# Patient Record
Sex: MALE | Race: OTHER | Employment: OTHER | URBAN - METROPOLITAN AREA
[De-identification: names, ages, dates, MRNs, and addresses within clinical notes are randomized per-mention and may not be internally consistent; named-entity substitution may affect disease eponyms.]

---

## 2017-01-04 DIAGNOSIS — R45.86 MOOD CHANGES: ICD-10-CM

## 2017-01-04 RX ORDER — BUPROPION HYDROCHLORIDE 75 MG/1
TABLET ORAL
Qty: 100 TAB | Refills: 0 | Status: SHIPPED | OUTPATIENT
Start: 2017-01-04 | End: 2017-01-23 | Stop reason: SDUPTHER

## 2017-01-04 NOTE — TELEPHONE ENCOUNTER
Patient's wife states patient is completely out of his medication. This patient contacted office for the following prescriptions to be filled:    Medication requested :   Requested Prescriptions     Pending Prescriptions Disp Refills    buPROPion (WELLBUTRIN) 75 mg tablet 100 Tab 0     Sig: Start taking one tab bid for one week then 2 tabs bid.      PCP: [facility]  Pharmacy or Print: [address]  Mail order or Local pharmacy: [phone]    Scheduled appointment if not seen by current providers in office: LOV: 12/2/2016, Next appt: 1/25/17

## 2017-01-10 ENCOUNTER — HOSPITAL ENCOUNTER (OUTPATIENT)
Age: 57
Discharge: HOME | End: 2017-01-10
Attending: PSYCHIATRY & NEUROLOGY
Payer: MEDICARE

## 2017-01-10 DIAGNOSIS — R20.9 CVA, OLD, ALTERATIONS OF SENSATIONS: ICD-10-CM

## 2017-01-10 DIAGNOSIS — I69.398 CVA, OLD, ALTERATIONS OF SENSATIONS: ICD-10-CM

## 2017-01-10 PROCEDURE — 70551 MRI BRAIN STEM W/O DYE: CPT

## 2017-01-13 ENCOUNTER — HOSPITAL ENCOUNTER (OUTPATIENT)
Dept: LAB | Age: 57
Discharge: HOME | End: 2017-01-13
Payer: MEDICARE

## 2017-01-13 LAB
ERYTHROCYTE [SEDIMENTATION RATE] IN BLOOD: 19 MM/HR (ref 0–20)
FOLATE SERPL-MCNC: >20 NG/ML (ref 3.1–17.5)
RPR SER QL: NONREACTIVE
T4 FREE SERPL-MCNC: 0.8 NG/DL (ref 0.7–1.5)
TSH SERPL DL<=0.05 MIU/L-ACNC: 2 UIU/ML (ref 0.36–3.74)
VIT B12 SERPL-MCNC: 291 PG/ML (ref 211–911)

## 2017-01-13 PROCEDURE — 84439 ASSAY OF FREE THYROXINE: CPT | Performed by: FAMILY MEDICINE

## 2017-01-13 PROCEDURE — 82607 VITAMIN B-12: CPT | Performed by: PSYCHIATRY & NEUROLOGY

## 2017-01-13 PROCEDURE — 86038 ANTINUCLEAR ANTIBODIES: CPT | Performed by: PSYCHIATRY & NEUROLOGY

## 2017-01-13 PROCEDURE — 85652 RBC SED RATE AUTOMATED: CPT | Performed by: PSYCHIATRY & NEUROLOGY

## 2017-01-13 PROCEDURE — 86592 SYPHILIS TEST NON-TREP QUAL: CPT | Performed by: PSYCHIATRY & NEUROLOGY

## 2017-01-13 PROCEDURE — 36415 COLL VENOUS BLD VENIPUNCTURE: CPT | Performed by: FAMILY MEDICINE

## 2017-01-14 LAB
ANA SER QL: NEGATIVE
SEE BELOW:, 164879: NORMAL

## 2017-01-17 NOTE — PROGRESS NOTES
Spoke with patient's wife (HIPPA verified) regarding thyroid function is within normal limits. Patient's wife informed patient to continue current dose of medication. Patient's wife concerned with patient's kidney function from previous lab results. Patient's wife informed to have patient keep follow up visit (1/25/2017) to discuss further. Patient's wife voiced understanding.

## 2017-01-23 DIAGNOSIS — F41.9 ANXIETY: ICD-10-CM

## 2017-01-23 DIAGNOSIS — R45.86 MOOD CHANGES: ICD-10-CM

## 2017-01-23 DIAGNOSIS — G62.9 NEUROPATHY: ICD-10-CM

## 2017-01-24 RX ORDER — AMITRIPTYLINE HYDROCHLORIDE 10 MG/1
TABLET, FILM COATED ORAL
Qty: 30 TAB | Refills: 0 | Status: SHIPPED | OUTPATIENT
Start: 2017-01-24 | End: 2020-02-23

## 2017-01-24 RX ORDER — BUPROPION HYDROCHLORIDE 75 MG/1
TABLET ORAL
Qty: 100 TAB | Refills: 0 | Status: SHIPPED | OUTPATIENT
Start: 2017-01-24 | End: 2020-02-23

## 2017-02-09 DIAGNOSIS — G25.81 RESTLESS LEG SYNDROME: ICD-10-CM

## 2017-02-09 RX ORDER — ROPINIROLE 0.25 MG/1
TABLET, FILM COATED ORAL
Qty: 30 TAB | Refills: 0 | Status: SHIPPED | COMMUNITY
Start: 2017-02-09

## 2017-05-10 ENCOUNTER — TELEPHONE (OUTPATIENT)
Dept: FAMILY MEDICINE CLINIC | Age: 57
End: 2017-05-10

## 2017-05-10 NOTE — TELEPHONE ENCOUNTER
Called Veronica Resendiz with [address] regarding we have not received the narrative with 6 questionnaire. Message left for Veronica Resendiz to resend the form to us and/or call back to discuss it.

## 2017-05-10 NOTE — TELEPHONE ENCOUNTER
Dwayne Ramírez sent over a request for a narrative questionnaire with 6 questions. She would like to know if it was received and will it be done. Please advise Dwayne Ramírez [phone] with [address] [place] [address].

## 2017-05-11 NOTE — TELEPHONE ENCOUNTER
Spoke with patient (2 verifiers name/) regarding not having received the narrative with the 6 questionnaire. Per Ms Hodan Lucio, they will not be needing the questionnaire and this time as they have already submitted the appeal and the questionnaire will be received by them too late. Ms Hodan Lucio informed Dr Ortiz Getting will be given the message. Ms Hodan Lucio voiced understanding.

## 2017-05-26 ENCOUNTER — TELEPHONE (OUTPATIENT)
Dept: FAMILY MEDICINE CLINIC | Age: 57
End: 2017-05-26

## 2017-05-26 NOTE — TELEPHONE ENCOUNTER
Left message for patient to call the office regarding Dr Yifan Mcleod message wanting to know if the patient has been seen by endocrinology and the need for follow up.

## 2017-06-06 ENCOUNTER — TELEPHONE (OUTPATIENT)
Dept: FAMILY MEDICINE CLINIC | Age: 57
End: 2017-06-06

## 2017-06-06 NOTE — TELEPHONE ENCOUNTER
Stacie Moffett from Dr. Tressa Julio office called this afternoon. She is requesting nurse to give her a call back regarding patient's disability activity restriction. Ph# [phone].

## 2017-06-06 NOTE — TELEPHONE ENCOUNTER
Left message on voicemail to call back. Need to inform that patient is no longer being seen at her office.

## 2019-05-21 ENCOUNTER — HOSPITAL ENCOUNTER (OUTPATIENT)
Dept: CT IMAGING | Age: 59
Discharge: HOME | End: 2019-05-21
Attending: INTERNAL MEDICINE
Payer: MEDICARE

## 2019-05-21 DIAGNOSIS — R63.4 LOSS OF WEIGHT: ICD-10-CM

## 2019-05-21 DIAGNOSIS — K90.9 INTESTINAL MALABSORPTION: ICD-10-CM

## 2019-05-21 LAB — CREAT UR-MCNC: 0.9 MG/DL (ref 0.6–1.3)

## 2019-05-21 PROCEDURE — 74177 CT ABD & PELVIS W/CONTRAST: CPT

## 2019-05-21 PROCEDURE — 74011636320 HC RX REV CODE- 636/320

## 2019-05-21 PROCEDURE — 82565 ASSAY OF CREATININE: CPT

## 2019-05-21 RX ADMIN — IOPAMIDOL 100 ML: 612 INJECTION, SOLUTION INTRAVENOUS at 14:14

## 2019-11-27 ENCOUNTER — HOSPITAL ENCOUNTER (OUTPATIENT)
Dept: GENERAL RADIOLOGY | Age: 59
Discharge: HOME | End: 2019-11-27
Attending: INTERNAL MEDICINE
Payer: MEDICARE

## 2019-11-27 DIAGNOSIS — R07.81 RIB PAIN ON LEFT SIDE: ICD-10-CM

## 2019-11-27 DIAGNOSIS — R63.4 LOSS OF WEIGHT: ICD-10-CM

## 2019-11-27 DIAGNOSIS — R63.0 LOSS OF APPETITE: ICD-10-CM

## 2019-11-27 PROCEDURE — 74011000255 HC RX REV CODE- 255

## 2019-11-27 PROCEDURE — 74245 XR UPPER GI/SMALL BOWEL: CPT

## 2019-11-27 PROCEDURE — 71100 X-RAY EXAM RIBS UNI 2 VIEWS: CPT

## 2019-11-27 RX ADMIN — BARIUM SULFATE 352 G: 960 POWDER, FOR SUSPENSION ORAL at 09:05

## 2019-11-27 RX ADMIN — BARIUM SULFATE 700 MG: 700 TABLET ORAL at 09:05

## 2019-12-04 NOTE — PROGRESS NOTES
Kathi from [facility] notified DR. Ochoa will follow pt's Plan of care.   Left message for patient to call the office regarding results.

## 2019-12-09 ENCOUNTER — HOSPITAL ENCOUNTER (OUTPATIENT)
Age: 59
Discharge: HOME | End: 2019-12-09
Attending: INTERNAL MEDICINE
Payer: MEDICARE

## 2019-12-09 DIAGNOSIS — K86.1 CHRONIC PANCREATITIS (HCC): ICD-10-CM

## 2019-12-09 LAB — CREAT UR-MCNC: 0.9 MG/DL (ref 0.6–1.3)

## 2019-12-09 PROCEDURE — 74011250636 HC RX REV CODE- 250/636: Performed by: INTERNAL MEDICINE

## 2019-12-09 PROCEDURE — 74183 MRI ABD W/O CNTR FLWD CNTR: CPT

## 2019-12-09 PROCEDURE — A9577 INJ MULTIHANCE: HCPCS | Performed by: INTERNAL MEDICINE

## 2019-12-09 PROCEDURE — 82565 ASSAY OF CREATININE: CPT

## 2019-12-09 RX ADMIN — GADOBENATE DIMEGLUMINE 20 ML: 529 INJECTION, SOLUTION INTRAVENOUS at 10:00

## 2020-02-23 PROBLEM — D72.829 LEUKOCYTOSIS: Status: ACTIVE | Noted: 2020-02-23

## 2020-02-23 PROBLEM — K86.1 CHRONIC PANCREATITIS (HCC): Status: ACTIVE | Noted: 2020-02-23

## 2020-02-23 PROBLEM — E86.0 DEHYDRATION: Status: ACTIVE | Noted: 2020-02-23

## 2020-02-23 PROBLEM — R11.2 INTRACTABLE NAUSEA AND VOMITING: Status: ACTIVE | Noted: 2020-02-23

## 2021-03-19 ENCOUNTER — OFFICE VISIT (OUTPATIENT)
Dept: ORTHOPEDIC SURGERY | Age: 61
End: 2021-03-19
Payer: MEDICARE

## 2021-03-19 VITALS
HEIGHT: 71 IN | WEIGHT: 173 LBS | OXYGEN SATURATION: 99 % | BODY MASS INDEX: 24.22 KG/M2 | HEART RATE: 83 BPM | RESPIRATION RATE: 16 BRPM | TEMPERATURE: 98 F

## 2021-03-19 DIAGNOSIS — M25.562 CHRONIC PAIN OF LEFT KNEE: ICD-10-CM

## 2021-03-19 DIAGNOSIS — M16.12 PRIMARY OSTEOARTHRITIS OF LEFT HIP: ICD-10-CM

## 2021-03-19 DIAGNOSIS — K85.20 ALCOHOL-INDUCED ACUTE PANCREATITIS, UNSPECIFIED COMPLICATION STATUS: ICD-10-CM

## 2021-03-19 DIAGNOSIS — M25.552 CHRONIC LEFT HIP PAIN: ICD-10-CM

## 2021-03-19 DIAGNOSIS — G89.29 CHRONIC LEFT HIP PAIN: ICD-10-CM

## 2021-03-19 DIAGNOSIS — G89.29 CHRONIC PAIN OF LEFT KNEE: ICD-10-CM

## 2021-03-19 DIAGNOSIS — M17.12 PRIMARY OSTEOARTHRITIS OF LEFT KNEE: Primary | ICD-10-CM

## 2021-03-19 PROCEDURE — 20610 DRAIN/INJ JOINT/BURSA W/O US: CPT | Performed by: SPECIALIST

## 2021-03-19 PROCEDURE — G8432 DEP SCR NOT DOC, RNG: HCPCS | Performed by: SPECIALIST

## 2021-03-19 PROCEDURE — G8420 CALC BMI NORM PARAMETERS: HCPCS | Performed by: SPECIALIST

## 2021-03-19 PROCEDURE — G8427 DOCREV CUR MEDS BY ELIG CLIN: HCPCS | Performed by: SPECIALIST

## 2021-03-19 PROCEDURE — 99203 OFFICE O/P NEW LOW 30 MIN: CPT | Performed by: SPECIALIST

## 2021-03-19 PROCEDURE — G8756 NO BP MEASURE DOC: HCPCS | Performed by: SPECIALIST

## 2021-03-19 PROCEDURE — 3017F COLORECTAL CA SCREEN DOC REV: CPT | Performed by: SPECIALIST

## 2021-03-19 RX ORDER — BETAMETHASONE SODIUM PHOSPHATE AND BETAMETHASONE ACETATE 3; 3 MG/ML; MG/ML
3 INJECTION, SUSPENSION INTRA-ARTICULAR; INTRALESIONAL; INTRAMUSCULAR; SOFT TISSUE ONCE
Status: COMPLETED | OUTPATIENT
Start: 2021-03-19 | End: 2021-03-19

## 2021-03-19 RX ADMIN — BETAMETHASONE SODIUM PHOSPHATE AND BETAMETHASONE ACETATE 3 MG: 3; 3 INJECTION, SUSPENSION INTRA-ARTICULAR; INTRALESIONAL; INTRAMUSCULAR; SOFT TISSUE at 16:15

## 2021-03-19 RX ADMIN — BETAMETHASONE SODIUM PHOSPHATE AND BETAMETHASONE ACETATE 3 MG: 3; 3 INJECTION, SUSPENSION INTRA-ARTICULAR; INTRALESIONAL; INTRAMUSCULAR; SOFT TISSUE at 16:14

## 2021-03-19 NOTE — PROGRESS NOTES
Patient: Luis Enrique Zarate                MRN: [id number]       SSN: [id number]  YOB: 1960        AGE: 56 y.o. SEX: male    PCP: Maureen Leblanc MD  03/19/21    CC: LEFT HIP AND LEFT KNEE PAIN    HISTORY:  Luis Enrique Zarate is a 56 y.o. male who is seen for left hip and left knee pain. He has been experiencing left hip pain for the past several months. He feels pain in his  groin and lateral hip with standing and walking. His hip stiffens up after he sits for long periods of time. He is also seen for left knee pain. He has been experiencing left knee pain for the past several months. He feels medial knee pain. He hyperflexed his left knee when he was walking down the stairs at his house 6 months ago. He feels pain with standing, walking and stair climbing. He experiences startup pain after sitting. He feels a burning sensation along his shin and top of his foot. He states that he sometimes feels like someone is sticking him in his knee with an ice pick. He states his pain incapacitates him so he has been spending most of his time in bed. He was hospitalized 5 days for pancreatitis and ketoacidosis. Pain Assessment  3/19/2021   Location of Pain Hip; Leg   Location Modifiers Left   Severity of Pain 8   Quality of Pain Burning   Quality of Pain Comment stabbing   Duration of Pain Persistent   Frequency of Pain Constant   Date Pain First Started (No Data)   Aggravating Factors Stretching;Bending;Walking   Limiting Behavior Yes   Relieving Factors Elevation;Nothing   Relieving Factors Comment wears band on leg   Result of Injury Yes   Type of Injury Fall     Occupation, etc:  Mr. Nayla Lyles receives social security disability benefits for diabetes, alcoholic pancreatitis, and early onset dementia. Rebel John He previously worked as a  for [employer]. He lives in [place] with his wife. He has 2 daughters and 1 son. He 6 grandsons. has  Mr. Nayla Lyles weighs 173 lbs and is 5'11\" tall.   He is an insulin controlled diabetic. Lab Results   Component Value Date/Time    Hemoglobin A1c 8.5 (H) 02/24/2020 08:49 AM    Hemoglobin A1c (POC) 10.5 12/02/2016 03:37 PM     Weight Metrics 3/19/2021 2/23/2020 2/22/2020 12/2/2016 9/16/2016 7/25/2016 12/28/2015   Weight 173 lb - 146 lb 188 lb 192 lb 3.2 oz 185 lb 181 lb 3.2 oz   BMI 24.13 kg/m2 20.36 kg/m2 - 26.22 kg/m2 26.81 kg/m2 25.81 kg/m2 25.28 kg/m2   Some encounter information is confidential and restricted. Go to Review Flowsheets activity to see all data. Patient Active Problem List   Diagnosis Code    Poorly controlled diabetes mellitus ([facility] [address] E11.65    HTN (hypertension) I10    Hyperlipidemia LDL goal < 100 E78.5    Alcohol abuse F10.10    Vitamin D deficiency E55.9    Balance problem R26.89    Neuropathy ([facility])/ was seeing specialist/ seen Dr. Gilmar Ferreira, EMG positive for neuropathy.   G62.9    Shoulder pain, bilateral M25.511, M25.512    Foot pain, bilateral M79.671, M79.672    Memory changes R41.3    H/O gastric bypass Z98.84    Chronic pancreatitis ([facility]) K86.1    Dehydration E86.0    Leukocytosis D72.829    Intractable nausea and vomiting R11.2    Uncontrolled diabetes mellitus type 2 without complications [id number]     REVIEW OF SYSTEMS:    Constitutional Symptoms: Negative   Eyes: Negative   Ears, Nose, Throat and Mouth: Negative   Cardiovascular: Negative   Respiratory: Negative   Genitourinary: Per HPI   Gastrointestinal: Per HPI   Integumentary (Skin and/or Breast): Negative   Musculoskeletal: Per HPI   Endocrine/Rheumatologic: Negative   Neurological: Per HPI   Hematology/Lymphatic: Negative    Allergic/Immunologic: Negative   Phychiatric: Negative    Social History     Socioeconomic History    Marital status: UNKNOWN     Spouse name: Not on file    Number of children: Not on file    Years of education: Not on file    Highest education level: Not on file   Occupational History    Not on file   Social Needs    Financial resource strain: Not on file    Food insecurity     Worry: Not on file     Inability: Not on file    Transportation needs     Medical: Not on file     Non-medical: Not on file   Tobacco Use    Smoking status: Never Smoker    Smokeless tobacco: Never Used   Substance and Sexual Activity    Alcohol use: No     Alcohol/week: 0.0 standard drinks     Comment: occasionally-states quit Thurs 8/1/13    Drug use: No    Sexual activity: Not on file   Lifestyle    Physical activity     Days per week: Not on file     Minutes per session: Not on file    Stress: Not on file   Relationships    Social connections     Talks on phone: Not on file     Gets together: Not on file     Attends Shinto service: Not on file     Active member of club or organization: Not on file     Attends meetings of clubs or organizations: Not on file     Relationship status: Not on file    Intimate partner violence     Fear of current or ex partner: Not on file     Emotionally abused: Not on file     Physically abused: Not on file     Forced sexual activity: Not on file   Other Topics Concern    Not on file   Social History Narrative    Not on file      Allergies   Allergen Reactions    Sulfa (Sulfonamide Antibiotics) Unknown (comments)    Trazodone Other (comments)     Passed out     Venlafaxine Other (comments)     Not able to feel good       Current Outpatient Medications   Medication Sig    naloxone (NARCAN) 4 mg/actuation nasal spray Use 1 spray intranasally, then discard. Repeat with new spray every 2 min as needed for opioid overdose symptoms, alternating nostrils.  omeprazole (PRILOSEC) 20 mg capsule Take 20 mg by mouth daily.  gabapentin (NEURONTIN) 800 mg tablet Take 800 mg by mouth two (2) times a day.  FLUoxetine (PROZAC) 40 mg capsule Take 40 mg by mouth daily.  metFORMIN (GLUCOPHAGE) 500 mg tablet Take 500 mg by mouth two (2) times daily (with meals).     ondansetron (ZOFRAN ODT) 4 mg disintegrating tablet Take 4 mg by mouth every eight (8) hours as needed for Nausea or Vomiting.  lipase-protease-amylase (CREON) 12,000-38,000 -60,000 unit capsule Take 2 Caps by mouth three (3) times daily (with meals).  donepeziL (ARICEPT) 10 mg tablet Take 10 mg by mouth nightly.  insulin NPH/insulin regular (NOVOLIN 70/30 U-100 INSULIN) 100 unit/mL (70-30) injection 15 Units by SubCUTAneous route Daily (before breakfast).  ALPRAZolam (XANAX) 1 mg tablet Take 1 mg by mouth nightly as needed for Anxiety or Sleep.  rOPINIRole (REQUIP) 0.25 mg tablet TAKE ONE TABLET BY MOUTH NIGHTLY    folic acid (FOLVITE) 1 mg tablet Take 1 Tab by mouth daily. No current facility-administered medications for this visit.        PHYSICAL EXAMINATION:  Visit Vitals  Pulse 83   Temp 98 °F (36.7 °C) (Temporal)   Resp 16   Ht 5' 11\" (1.803 m)   Wt 173 lb (78.5 kg)   SpO2 99%   BMI 24.13 kg/m²      ORTHO EXAMINATION:  Examination Right knee Left knee   Skin Intact Intact   Range of motion 120-0 100-10   Effusion - -   Medial joint line tenderness - +   Lateral joint line tenderness - +   Popliteal tenderness - -   Osteophytes palpable - +   Tyes - -   Patella crepitus - -   Anterior drawer - -   Lateral laxity - -   Medial laxity - -   Varus deformity - -   Valgus deformity - -   Pretibial edema - -   Calf tenderness - -     Examination Right hip Left hip   Skin Intact Intact   External Rotation ROM 20 15   Internal Rotation ROM 10 10   Trochanteric tenderness - + posterior   Hip flexion contracture - -   Antalgic gait - -   Trendelenberg sign - -   Lumbar tenderness - -   Straight leg raise - -   Calf tenderness - -   Neurovascular Intact Intact    Ambulating with single point cane  Walking with limp    TIME OUT:  Chart reviewed for the following:   Lydia OLIVA MD, have reviewed the History, Physical and updated the Allergic reactions for Yuliana Albino   TIME OUT performed immediately prior to start of procedure:  Lydia OLIVA, MD, have performed the following reviews on Glendy Valdez prior to the start of the procedure:          * Patient was identified by name and date of birth   * Agreement on procedure being performed was verified  * Risks and Benefits explained to the patient  * Procedure site verified and marked as necessary  * Patient was positioned for comfort  * Consent was obtained     Time: 4:03 PM     Date of procedure: 3/19/2021  Procedure performed by:  Bhumika Mcdaniel MD  Mr. Pamela Bosch tolerated the procedure well with no complications. CT LLE 2/24/20 [facility]  IMPRESSION:  1. Well-corticated deformity at the proximal fibula, likely reflecting sequelae  of chronic injury. Subtle acute fracture is felt less likely. 2.  Elsewhere, no evidence of acute fracture. 3.  Osteopenia. 4.  Mild to moderate osteoarthritic changes. RADIOGRAPHS:  XR LEFT HIP 2/23/20 [facility]  IMPRESSION:  1. Negative for fracture. 2. Mild osteoarthritis of both hips. XR LEFT HIP 2/23/20 [facility]  IMPRESSION:  1. Negative for fracture. 2. Osteopenia and mild osteoarthritis of the left knee. IMPRESSION:      ICD-10-CM ICD-9-CM    1. Primary osteoarthritis of left knee  M17.12 715.16 betamethasone (CELESTONE) injection 3 mg      DRAIN/INJECT LARGE JOINT/BURSA      PROCEDURE AUTHORIZATION TO    2. Chronic pain of left knee  M25.562 719.46     G89.29 338.29    3. Primary osteoarthritis of left hip  M16.12 715.15 betamethasone (CELESTONE) injection 3 mg      DRAIN/INJECT LARGE JOINT/BURSA   4. Chronic left hip pain  M25.552 719.45     G89.29 338.29    5. Alcohol-induced acute pancreatitis, unspecified complication status  S02.44 577.0      PLAN:  OTC analgesics for pain. Consider visco supplementation if pain continues. After discussing treatment options, patient's left knee and left lateral hip was injected with 4 cc Marcaine and 1/2 cc Celestone. There is no need for surgery at this time. He will follow up as needed.       Scribed by Milad Espinosa Camacho (Xavier Gerber) as dictated by Loretta Jama MD

## 2021-03-25 ENCOUNTER — TELEPHONE (OUTPATIENT)
Dept: ORTHOPEDIC SURGERY | Age: 61
End: 2021-03-25

## 2021-03-25 NOTE — TELEPHONE ENCOUNTER
Patients wife, Ramin Childs, called on behalf of patient. Patients hip pain has not improved after getting an injection at last weeks visit and they're asking if we can offer them any advice as to what to do. Please contact Ramin Childs at [phone].

## 2021-03-26 NOTE — TELEPHONE ENCOUNTER
Per Dr. Soledad Elizondo need to give the injection up to 3 weeks use OTC tylenol or motrin as prescribed by  for pain.

## 2021-04-16 ENCOUNTER — OFFICE VISIT (OUTPATIENT)
Dept: ORTHOPEDIC SURGERY | Age: 61
End: 2021-04-16
Payer: MEDICARE

## 2021-04-16 VITALS
WEIGHT: 168 LBS | OXYGEN SATURATION: 98 % | HEART RATE: 78 BPM | RESPIRATION RATE: 16 BRPM | TEMPERATURE: 96.9 F | HEIGHT: 71 IN | BODY MASS INDEX: 23.52 KG/M2

## 2021-04-16 DIAGNOSIS — M17.12 PRIMARY OSTEOARTHRITIS OF LEFT KNEE: Primary | ICD-10-CM

## 2021-04-16 DIAGNOSIS — G89.29 CHRONIC LEFT HIP PAIN: ICD-10-CM

## 2021-04-16 DIAGNOSIS — M16.12 PRIMARY OSTEOARTHRITIS OF LEFT HIP: ICD-10-CM

## 2021-04-16 DIAGNOSIS — M25.562 CHRONIC PAIN OF LEFT KNEE: ICD-10-CM

## 2021-04-16 DIAGNOSIS — M54.16 LUMBAR RADICULOPATHY: ICD-10-CM

## 2021-04-16 DIAGNOSIS — M25.552 CHRONIC LEFT HIP PAIN: ICD-10-CM

## 2021-04-16 DIAGNOSIS — M54.50 LUMBAR PAIN: ICD-10-CM

## 2021-04-16 DIAGNOSIS — G89.29 CHRONIC PAIN OF LEFT KNEE: ICD-10-CM

## 2021-04-16 PROCEDURE — G8432 DEP SCR NOT DOC, RNG: HCPCS | Performed by: SPECIALIST

## 2021-04-16 PROCEDURE — 99213 OFFICE O/P EST LOW 20 MIN: CPT | Performed by: SPECIALIST

## 2021-04-16 PROCEDURE — G8756 NO BP MEASURE DOC: HCPCS | Performed by: SPECIALIST

## 2021-04-16 PROCEDURE — G8420 CALC BMI NORM PARAMETERS: HCPCS | Performed by: SPECIALIST

## 2021-04-16 PROCEDURE — G8427 DOCREV CUR MEDS BY ELIG CLIN: HCPCS | Performed by: SPECIALIST

## 2021-04-16 PROCEDURE — 20610 DRAIN/INJ JOINT/BURSA W/O US: CPT | Performed by: SPECIALIST

## 2021-04-16 PROCEDURE — 3017F COLORECTAL CA SCREEN DOC REV: CPT | Performed by: SPECIALIST

## 2021-04-16 RX ORDER — BETAMETHASONE SODIUM PHOSPHATE AND BETAMETHASONE ACETATE 3; 3 MG/ML; MG/ML
3 INJECTION, SUSPENSION INTRA-ARTICULAR; INTRALESIONAL; INTRAMUSCULAR; SOFT TISSUE ONCE
Status: COMPLETED | OUTPATIENT
Start: 2021-04-16 | End: 2021-04-16

## 2021-04-16 RX ADMIN — BETAMETHASONE SODIUM PHOSPHATE AND BETAMETHASONE ACETATE 3 MG: 3; 3 INJECTION, SUSPENSION INTRA-ARTICULAR; INTRALESIONAL; INTRAMUSCULAR; SOFT TISSUE at 14:33

## 2021-04-16 NOTE — PROGRESS NOTES
Patient: Anayeli Aburto                MRN: [id number]       SSN: [id number]  YOB: 1960        AGE: 56 y.o. SEX: male    PCP: April Caputo MD  04/16/21    CC: LEFT HIP AND LEFT KNEE PAIN    HISTORY:  Anayeli Aburto is a 56 y.o. male who is seen for left hip and left knee pain. He has been experiencing left hip pain for the past several months. He feels pain in his  groin and lateral hip with standing and walking. His hip stiffens up after he sits for long periods of time. His pain begins in his back and radiates down his left leg. He feels throbbing and numbness. He is also seen for left knee pain. He has been experiencing left knee pain for the past several months. He feels medial knee pain. He hyperflexed his left knee when he was walking down the stairs at his house 6 months ago. He feels pain with standing, walking and stair climbing. He experiences startup pain after sitting. He feels a burning sensation along his shin and top of his foot. He states that he sometimes feels like someone is sticking him in his knee with an ice pick. He states his pain incapacitates him so he has been spending most of his time in bed. He was hospitalized 5 days for pancreatitis and ketoacidosis. Pain Assessment  4/16/2021   Location of Pain Leg   Location Modifiers Right;Left   Severity of Pain 9   Quality of Pain Aching;Burning; Other (Comment)   Quality of Pain Comment spiking   Duration of Pain Persistent   Frequency of Pain Constant   Date Pain First Started -   Date Pain First Started Comment -   Aggravating Factors Other (Comment); Standing;Walking   Aggravating Factors Comment movment   Limiting Behavior Yes   Relieving Factors Heat   Relieving Factors Comment -   Result of Injury No   Type of Injury -     Occupation, etc:  Mr. Alexandre Boss receives social security disability benefits for diabetes, alcoholic pancreatitis, and early onset dementia.  He previously worked as a  for [employer]. He lives in [place] with his wife. He has 2 daughters and 1 son. He 6 grandsons. has  Mr. Velázquez Band weighs 168 lbs and is 5'11\" tall. He is an insulin controlled diabetic. Lab Results   Component Value Date/Time    Hemoglobin A1c 8.5 (H) 02/24/2020 08:49 AM    Hemoglobin A1c (POC) 10.5 12/02/2016 03:37 PM     Weight Metrics 4/16/2021 3/19/2021 2/23/2020 2/22/2020 12/2/2016 9/16/2016 7/25/2016   Weight 168 lb 173 lb - 146 lb 188 lb 192 lb 3.2 oz 185 lb   BMI 23.43 kg/m2 24.13 kg/m2 20.36 kg/m2 - 26.22 kg/m2 26.81 kg/m2 25.81 kg/m2   Some encounter information is confidential and restricted. Go to Review Flowsheets activity to see all data. Patient Active Problem List   Diagnosis Code    Poorly controlled diabetes mellitus ([facility] [address] E11.65    HTN (hypertension) I10    Hyperlipidemia LDL goal < 100 E78.5    Alcohol abuse F10.10    Vitamin D deficiency E55.9    Balance problem R26.89    Neuropathy ([facility])/ was seeing specialist/ seen Dr. Elvi Calderon, EMG positive for neuropathy.   G62.9    Shoulder pain, bilateral M25.511, M25.512    Foot pain, bilateral M79.671, M79.672    Memory changes R41.3    H/O gastric bypass Z98.84    Chronic pancreatitis ([facility]) K86.1    Dehydration E86.0    Leukocytosis D72.829    Intractable nausea and vomiting R11.2    Uncontrolled diabetes mellitus type 2 without complications [id number]     REVIEW OF SYSTEMS:    Constitutional Symptoms: Negative   Eyes: Negative   Ears, Nose, Throat and Mouth: Negative   Cardiovascular: Negative   Respiratory: Negative   Genitourinary: Per HPI   Gastrointestinal: Per HPI   Integumentary (Skin and/or Breast): Negative   Musculoskeletal: Per HPI   Endocrine/Rheumatologic: Negative   Neurological: Per HPI   Hematology/Lymphatic: Negative    Allergic/Immunologic: Negative   Phychiatric: Negative    Social History     Socioeconomic History    Marital status: UNKNOWN     Spouse name: Not on file    Number of children: Not on file    Years of education: Not on file    Highest education level: Not on file   Occupational History    Not on file   Social Needs    Financial resource strain: Not on file    Food insecurity     Worry: Not on file     Inability: Not on file    Transportation needs     Medical: Not on file     Non-medical: Not on file   Tobacco Use    Smoking status: Never Smoker    Smokeless tobacco: Never Used   Substance and Sexual Activity    Alcohol use: No     Alcohol/week: 0.0 standard drinks     Comment: occasionally-states quit Thurs 8/1/13    Drug use: No    Sexual activity: Not on file   Lifestyle    Physical activity     Days per week: Not on file     Minutes per session: Not on file    Stress: Not on file   Relationships    Social connections     Talks on phone: Not on file     Gets together: Not on file     Attends Congregation service: Not on file     Active member of club or organization: Not on file     Attends meetings of clubs or organizations: Not on file     Relationship status: Not on file    Intimate partner violence     Fear of current or ex partner: Not on file     Emotionally abused: Not on file     Physically abused: Not on file     Forced sexual activity: Not on file   Other Topics Concern    Not on file   Social History Narrative    Not on file      Allergies   Allergen Reactions    Sulfa (Sulfonamide Antibiotics) Unknown (comments)    Trazodone Other (comments)     Passed out     Venlafaxine Other (comments)     Not able to feel good       Current Outpatient Medications   Medication Sig    naloxone (NARCAN) 4 mg/actuation nasal spray Use 1 spray intranasally, then discard. Repeat with new spray every 2 min as needed for opioid overdose symptoms, alternating nostrils.  omeprazole (PRILOSEC) 20 mg capsule Take 20 mg by mouth daily.  gabapentin (NEURONTIN) 800 mg tablet Take 800 mg by mouth two (2) times a day.  FLUoxetine (PROZAC) 40 mg capsule Take 40 mg by mouth daily.     metFORMIN (GLUCOPHAGE) 500 mg tablet Take 500 mg by mouth two (2) times daily (with meals).  ondansetron (ZOFRAN ODT) 4 mg disintegrating tablet Take 4 mg by mouth every eight (8) hours as needed for Nausea or Vomiting.  lipase-protease-amylase (CREON) 12,000-38,000 -60,000 unit capsule Take 2 Caps by mouth three (3) times daily (with meals).  donepeziL (ARICEPT) 10 mg tablet Take 10 mg by mouth nightly.  insulin NPH/insulin regular (NOVOLIN 70/30 U-100 INSULIN) 100 unit/mL (70-30) injection 15 Units by SubCUTAneous route Daily (before breakfast).  ALPRAZolam (XANAX) 1 mg tablet Take 1 mg by mouth nightly as needed for Anxiety or Sleep.  rOPINIRole (REQUIP) 0.25 mg tablet TAKE ONE TABLET BY MOUTH NIGHTLY    folic acid (FOLVITE) 1 mg tablet Take 1 Tab by mouth daily. No current facility-administered medications for this visit.        PHYSICAL EXAMINATION:  Visit Vitals  Pulse 78   Temp 96.9 °F (36.1 °C)   Resp 16   Ht 5' 11\" (1.803 m)   Wt 168 lb (76.2 kg)   SpO2 98%   BMI 23.43 kg/m²      ORTHO EXAMINATION:  Examination Right knee Left knee   Skin Intact Intact   Range of motion 120-0 100-10   Effusion - -   Medial joint line tenderness - +   Lateral joint line tenderness - +   Popliteal tenderness - -   Osteophytes palpable - +   Tyes - -   Patella crepitus - -   Anterior drawer - -   Lateral laxity - -   Medial laxity - -   Varus deformity - -   Valgus deformity - -   Pretibial edema - -   Calf tenderness - -     Examination Right hip Left hip   Skin Intact Intact   External Rotation ROM 20 15   Internal Rotation ROM 10 10   Trochanteric tenderness - + posterior trochanteric   Hip flexion contracture - -   Antalgic gait - -   Trendelenberg sign - -   Lumbar tenderness - -   Straight leg raise - -   Calf tenderness - -   Neurovascular Intact Intact    Ambulating with single point cane  Walking with limp    TIME OUT:  Chart reviewed for the following:   Ernie Holm MD, have reviewed the History, Physical and updated the Allergic reactions for Migdalia Oris   TIME OUT performed immediately prior to start of procedure:  Elis Best MD, have performed the following reviews on Migdalia Oris prior to the start of the procedure:          * Patient was identified by name and date of birth   * Agreement on procedure being performed was verified  * Risks and Benefits explained to the patient  * Procedure site verified and marked as necessary  * Patient was positioned for comfort  * Consent was obtained     Time: 2:22 PM     Date of procedure: 4/16/2021  Procedure performed by:  Fracisco Young MD  Mr. Janeen Plata tolerated the procedure well with no complications. CT LLE 2/24/20 [facility]  IMPRESSION:  1. Well-corticated deformity at the proximal fibula, likely reflecting sequelae  of chronic injury. Subtle acute fracture is felt less likely. 2.  Elsewhere, no evidence of acute fracture. 3.  Osteopenia. 4.  Mild to moderate osteoarthritic changes. RADIOGRAPHS:  XR LEFT HIP 2/23/20 [facility]  IMPRESSION:  1. Negative for fracture. 2. Mild osteoarthritis of both hips. XR LEFT HIP 2/23/20 [facility]  IMPRESSION:  1. Negative for fracture. 2. Osteopenia and mild osteoarthritis of the left knee. IMPRESSION:      ICD-10-CM ICD-9-CM    1. Primary osteoarthritis of left knee  M17.12 715.16 DRAIN/INJECT LARGE JOINT/BURSA      sodium hyaluronate (viscosup) (ORTHOVISC) 30 mg/2 mL injection syrg 30 mg   2. Chronic pain of left knee  M25.562 719.46 DRAIN/INJECT LARGE JOINT/BURSA    G89.29 338.29 sodium hyaluronate (viscosup) (ORTHOVISC) 30 mg/2 mL injection syrg 30 mg   3. Primary osteoarthritis of left hip  M16.12 715.15 betamethasone (CELESTONE) injection 3 mg      DRAIN/INJECT LARGE JOINT/BURSA   4. Chronic left hip pain  M25.552 719.45     G89.29 338.29    5. Lumbar pain  M54.5 724.2 REFERRAL TO SPINE SURGERY   6.  Lumbar radiculopathy  M54.16 724.4 REFERRAL TO SPINE SURGERY     PLAN:   After discussing treatment options, patient's left knee was injected with 2 cc Orthovisc and left lateral hip was injected with 4 cc Marcaine and 1/2 cc Celestone. There is no need for surgery at this time. He will follow up in 1 week for Orthovisc #2. He will follow up at the spine center.       Scribed by Kolby Cruz ([address] [place] [address] [address]) as dictated by Ashley Roca MD

## 2021-04-22 NOTE — PROGRESS NOTES
Patient: Lorie Bernal                MRN: [id number]       SSN: [id number]  YOB: 1960        AGE: 56 y.o. SEX: male  Body mass index is 23.38 kg/m². PCP: Radha Taylor MD  04/23/21    Chief Complaint   Patient presents with    Hip Pain     bilateral hips    Knee Pain     left knee    Leg Pain     left leg     HISTORY:  Lorie Bernal is a 56 y.o. male who is seen for left knee pain. ICD-10-CM ICD-9-CM    1. Primary osteoarthritis of left knee  M17.12 715.16 sodium hyaluronate (viscosup) (ORTHOVISC) 30 mg/2 mL injection syrg 30 mg      DRAIN/INJECT LARGE JOINT/BURSA   2. Chronic pain of left knee  M25.562 719.46 sodium hyaluronate (viscosup) (ORTHOVISC) 30 mg/2 mL injection syrg 30 mg    G89.29 338.29 DRAIN/INJECT LARGE JOINT/BURSA     PROCEDURE:  After discussing treatment options, Mr. Hoang Newton left knee was injected with 2 cc of Orthovisc. Chart reviewed for the following:   Marlene Carranza MD, have reviewed the History, Physical and updated the Allergic reactions for Lorie Bernal     TIME OUT performed immediately prior to start of procedure:  Marlene Carranza MD, have performed the following reviews on Lorie Bernal prior to the start of the procedure:            * Patient was identified by name and date of birth   * Agreement on procedure being performed was verified  * Risks and Benefits explained to the patient  * Procedure site verified and marked as necessary  * Patient was positioned for comfort  * Consent was obtained     Time: 2:10 PM     Date of procedure: 4/23/2021    Procedure performed by:  Kelli Dhaliwal MD    Mr. Darwin Castro tolerated the procedure well with no complications. PLAN:  After discussing treatment options, patient's left knee was injected with 2 cc of Orthovisc. Mr. Darwin Castro will follow up in one week to continue his his visco supplementation injection series.       Scribed by Mckay Culp ([address]) as dictated by Kelli Dhaliwal, MD

## 2021-04-23 ENCOUNTER — OFFICE VISIT (OUTPATIENT)
Dept: ORTHOPEDIC SURGERY | Age: 61
End: 2021-04-23
Payer: MEDICARE

## 2021-04-23 VITALS
TEMPERATURE: 97.1 F | HEART RATE: 92 BPM | HEIGHT: 71 IN | BODY MASS INDEX: 23.46 KG/M2 | OXYGEN SATURATION: 98 % | WEIGHT: 167.6 LBS

## 2021-04-23 DIAGNOSIS — G89.29 CHRONIC PAIN OF LEFT KNEE: ICD-10-CM

## 2021-04-23 DIAGNOSIS — M17.12 PRIMARY OSTEOARTHRITIS OF LEFT KNEE: Primary | ICD-10-CM

## 2021-04-23 DIAGNOSIS — M25.562 CHRONIC PAIN OF LEFT KNEE: ICD-10-CM

## 2021-04-23 PROCEDURE — G8427 DOCREV CUR MEDS BY ELIG CLIN: HCPCS | Performed by: SPECIALIST

## 2021-04-23 PROCEDURE — 99213 OFFICE O/P EST LOW 20 MIN: CPT | Performed by: SPECIALIST

## 2021-04-23 PROCEDURE — G8420 CALC BMI NORM PARAMETERS: HCPCS | Performed by: SPECIALIST

## 2021-04-23 PROCEDURE — G8432 DEP SCR NOT DOC, RNG: HCPCS | Performed by: SPECIALIST

## 2021-04-23 PROCEDURE — 3017F COLORECTAL CA SCREEN DOC REV: CPT | Performed by: SPECIALIST

## 2021-04-23 PROCEDURE — 20610 DRAIN/INJ JOINT/BURSA W/O US: CPT | Performed by: SPECIALIST

## 2021-04-23 PROCEDURE — G8756 NO BP MEASURE DOC: HCPCS | Performed by: SPECIALIST

## 2021-04-30 ENCOUNTER — OFFICE VISIT (OUTPATIENT)
Dept: ORTHOPEDIC SURGERY | Age: 61
End: 2021-04-30
Payer: MEDICARE

## 2021-04-30 VITALS
HEIGHT: 71 IN | BODY MASS INDEX: 22.68 KG/M2 | RESPIRATION RATE: 16 BRPM | WEIGHT: 162 LBS | HEART RATE: 97 BPM | TEMPERATURE: 97.4 F | OXYGEN SATURATION: 99 %

## 2021-04-30 DIAGNOSIS — S39.012A STRAIN OF LUMBAR REGION, INITIAL ENCOUNTER: ICD-10-CM

## 2021-04-30 DIAGNOSIS — G89.29 CHRONIC PAIN OF LEFT KNEE: ICD-10-CM

## 2021-04-30 DIAGNOSIS — M25.562 CHRONIC PAIN OF LEFT KNEE: ICD-10-CM

## 2021-04-30 DIAGNOSIS — M54.50 LUMBAR PAIN: ICD-10-CM

## 2021-04-30 DIAGNOSIS — M17.12 PRIMARY OSTEOARTHRITIS OF LEFT KNEE: Primary | ICD-10-CM

## 2021-04-30 PROCEDURE — 3017F COLORECTAL CA SCREEN DOC REV: CPT | Performed by: SPECIALIST

## 2021-04-30 PROCEDURE — 99213 OFFICE O/P EST LOW 20 MIN: CPT | Performed by: SPECIALIST

## 2021-04-30 PROCEDURE — G8420 CALC BMI NORM PARAMETERS: HCPCS | Performed by: SPECIALIST

## 2021-04-30 PROCEDURE — 20610 DRAIN/INJ JOINT/BURSA W/O US: CPT | Performed by: SPECIALIST

## 2021-04-30 PROCEDURE — 20552 NJX 1/MLT TRIGGER POINT 1/2: CPT | Performed by: SPECIALIST

## 2021-04-30 PROCEDURE — G8432 DEP SCR NOT DOC, RNG: HCPCS | Performed by: SPECIALIST

## 2021-04-30 PROCEDURE — G8427 DOCREV CUR MEDS BY ELIG CLIN: HCPCS | Performed by: SPECIALIST

## 2021-04-30 PROCEDURE — G8756 NO BP MEASURE DOC: HCPCS | Performed by: SPECIALIST

## 2021-04-30 RX ORDER — BETAMETHASONE SODIUM PHOSPHATE AND BETAMETHASONE ACETATE 3; 3 MG/ML; MG/ML
3 INJECTION, SUSPENSION INTRA-ARTICULAR; INTRALESIONAL; INTRAMUSCULAR; SOFT TISSUE ONCE
Status: COMPLETED | OUTPATIENT
Start: 2021-04-30 | End: 2021-04-30

## 2021-04-30 RX ADMIN — BETAMETHASONE SODIUM PHOSPHATE AND BETAMETHASONE ACETATE 3 MG: 3; 3 INJECTION, SUSPENSION INTRA-ARTICULAR; INTRALESIONAL; INTRAMUSCULAR; SOFT TISSUE at 15:05

## 2021-04-30 NOTE — PROGRESS NOTES
Patient: Nithya Jacobs                MRN: [id number]       SSN: [id number]  YOB: 1960        AGE: 56 y.o. SEX: male    PCP: Vic Wagoner MD  04/30/21    CC: LEFT KNEE AND BACK PAIN    HISTORY:  Nithya Jacobs is a 56 y.o. male who is seen for lower back pain. He denies any known injury to his back since he is mostly bedridden. He has not been seen by anyone previously for his back pain. He feels back pain with standing and walking. He experiences startup pain after sitting. He feels medial knee pain. He hyperflexed his left knee when he was walking down the stairs at his house 6 months ago. He states that he sometimes feels like someone is sticking him in his knee with an ice pick. He states his pain incapacitates him so he has been spending most of his time in bed. He was hospitalized 5 days for pancreatitis and ketoacidosis. Pain Assessment  4/30/2021   Location of Pain Knee; Hip   Location Modifiers Left;Right   Severity of Pain 9   Quality of Pain Aching   Quality of Pain Comment -   Duration of Pain Persistent   Frequency of Pain -   Date Pain First Started -   Date Pain First Started Comment -   Aggravating Factors Walking   Aggravating Factors Comment -   Limiting Behavior Yes   Relieving Factors Other (Comment)   Relieving Factors Comment -   Result of Injury -   Type of Injury -     Occupation, etc:  Mr. Isaiah Calvert receives social security disability benefits for diabetes, alcoholic pancreatitis, and early onset dementia. Gamal Castillo He previously worked as a  for [employer]. He lives in [place] with his wife. He has 2 daughters and 1 son. He 6 grandsons. has  Mr. Isaiah Calvert weighs 162 lbs and is 5'11\" tall. He is an insulin controlled diabetic.       Lab Results   Component Value Date/Time    Hemoglobin A1c 8.5 (H) 02/24/2020 08:49 AM    Hemoglobin A1c (POC) 10.5 12/02/2016 03:37 PM     Weight Metrics 4/30/2021 4/23/2021 4/16/2021 3/19/2021 2/23/2020 2/22/2020 12/2/2016   Weight 162 lb 167 lb 9.6 oz 168 lb 173 lb - 146 lb 188 lb   BMI 22.59 kg/m2 23.38 kg/m2 23.43 kg/m2 24.13 kg/m2 20.36 kg/m2 - 26.22 kg/m2   Some encounter information is confidential and restricted. Go to Review Flowsheets activity to see all data. Patient Active Problem List   Diagnosis Code    Poorly controlled diabetes mellitus ([facility] [address] E11.65    HTN (hypertension) I10    Hyperlipidemia LDL goal < 100 E78.5    Alcohol abuse F10.10    Vitamin D deficiency E55.9    Balance problem R26.89    Neuropathy ([facility])/ was seeing specialist/ seen Dr. Kiko Thomas, EMG positive for neuropathy.   G62.9    Shoulder pain, bilateral M25.511, M25.512    Foot pain, bilateral M79.671, M79.672    Memory changes R41.3    H/O gastric bypass Z98.84    Chronic pancreatitis ([facility]) K86.1    Dehydration E86.0    Leukocytosis D72.829    Intractable nausea and vomiting R11.2    Uncontrolled diabetes mellitus type 2 without complications [id number]     REVIEW OF SYSTEMS:    Constitutional Symptoms: Negative   Eyes: Negative   Ears, Nose, Throat and Mouth: Negative   Cardiovascular: Negative   Respiratory: Negative   Genitourinary: Per HPI   Gastrointestinal: Per HPI   Integumentary (Skin and/or Breast): Negative   Musculoskeletal: Per HPI   Endocrine/Rheumatologic: Negative   Neurological: Per HPI   Hematology/Lymphatic: Negative    Allergic/Immunologic: Negative   Phychiatric: Negative    Social History     Socioeconomic History    Marital status: UNKNOWN     Spouse name: Not on file    Number of children: Not on file    Years of education: Not on file    Highest education level: Not on file   Occupational History    Not on file   Social Needs    Financial resource strain: Not on file    Food insecurity     Worry: Not on file     Inability: Not on file    Transportation needs     Medical: Not on file     Non-medical: Not on file   Tobacco Use    Smoking status: Never Smoker    Smokeless tobacco: Never Used   Substance and Sexual Activity    Alcohol use: No     Alcohol/week: 0.0 standard drinks     Comment: occasionally-states quit Thurs 8/1/13    Drug use: No    Sexual activity: Not on file   Lifestyle    Physical activity     Days per week: Not on file     Minutes per session: Not on file    Stress: Not on file   Relationships    Social connections     Talks on phone: Not on file     Gets together: Not on file     Attends Advent service: Not on file     Active member of club or organization: Not on file     Attends meetings of clubs or organizations: Not on file     Relationship status: Not on file    Intimate partner violence     Fear of current or ex partner: Not on file     Emotionally abused: Not on file     Physically abused: Not on file     Forced sexual activity: Not on file   Other Topics Concern    Not on file   Social History Narrative    Not on file      Allergies   Allergen Reactions    Sulfa (Sulfonamide Antibiotics) Unknown (comments)    Trazodone Other (comments)     Passed out     Venlafaxine Other (comments)     Not able to feel good       Current Outpatient Medications   Medication Sig    naloxone (NARCAN) 4 mg/actuation nasal spray Use 1 spray intranasally, then discard. Repeat with new spray every 2 min as needed for opioid overdose symptoms, alternating nostrils.  gabapentin (NEURONTIN) 800 mg tablet Take 800 mg by mouth two (2) times a day.  FLUoxetine (PROZAC) 40 mg capsule Take 40 mg by mouth daily.  metFORMIN (GLUCOPHAGE) 500 mg tablet Take 500 mg by mouth two (2) times daily (with meals).  ondansetron (ZOFRAN ODT) 4 mg disintegrating tablet Take 4 mg by mouth every eight (8) hours as needed for Nausea or Vomiting.  lipase-protease-amylase (CREON) 12,000-38,000 -60,000 unit capsule Take 2 Caps by mouth three (3) times daily (with meals).  donepeziL (ARICEPT) 10 mg tablet Take 10 mg by mouth nightly.     insulin NPH/insulin regular (NOVOLIN 70/30 U-100 INSULIN) 100 unit/mL (70-30) injection 15 Units by SubCUTAneous route Daily (before breakfast).  ALPRAZolam (XANAX) 1 mg tablet Take 1 mg by mouth nightly as needed for Anxiety or Sleep.  rOPINIRole (REQUIP) 0.25 mg tablet TAKE ONE TABLET BY MOUTH NIGHTLY    folic acid (FOLVITE) 1 mg tablet Take 1 Tab by mouth daily.  omeprazole (PRILOSEC) 20 mg capsule Take 20 mg by mouth daily. No current facility-administered medications for this visit.        PHYSICAL EXAMINATION:  Visit Vitals  Pulse 97   Temp 97.4 °F (36.3 °C)   Resp 16   Ht 5' 11\" (1.803 m)   Wt 162 lb (73.5 kg)   SpO2 99%   BMI 22.59 kg/m²      ORTHO EXAMINATION:  Examination Lumbar Thoracic   Skin Intact Intact   Tenderness + paralumbar -   Tightness + paralumbar -   Lordosis Normal N/A   Kyphosis N/A Normal   Scoliosis - -   Flexion Fingertips to ankle N/A   Extension 10 N/A   Knee reflexes Normal N/A   Ankle reflexes Normal N/A   Straight leg raise - N/A   Calf tenderness - N/A       Examination Right knee Left knee   Skin Intact Intact   Range of motion 120-0 100-10   Effusion - -   Medial joint line tenderness - +   Lateral joint line tenderness - +   Popliteal tenderness - -   Osteophytes palpable - +   Tyes - -   Patella crepitus - -   Anterior drawer - -   Lateral laxity - -   Medial laxity - -   Varus deformity - -   Valgus deformity - -   Pretibial edema - -   Calf tenderness - -    Ambulating with single point cane  Walking with limp    TIME OUT:  Chart reviewed for the following:   I, Parviz Ceballos MD, have reviewed the History, Physical and updated the Allergic reactions for Erica Moseley   TIME OUT performed immediately prior to start of procedure:  Juan Lainez MD, have performed the following reviews on Erica Adelae prior to the start of the procedure:          * Patient was identified by name and date of birth   * Agreement on procedure being performed was verified  * Risks and Benefits explained to the patient  * Procedure site verified and marked as necessary  * Patient was positioned for comfort  * Consent was obtained     Time: 4:03 PM     Date of procedure: 4/30/2021  Procedure performed by:  Soren Winn MD  Mr. Nena Beltre tolerated the procedure well with no complications. CT LLE 2/24/20 [facility]  IMPRESSION:  1. Well-corticated deformity at the proximal fibula, likely reflecting sequelae  of chronic injury. Subtle acute fracture is felt less likely. 2.  Elsewhere, no evidence of acute fracture. 3.  Osteopenia. 4.  Mild to moderate osteoarthritic changes. RADIOGRAPHS:  XR LEFT HIP 2/23/20 [facility]  IMPRESSION:  1. Negative for fracture. 2. Mild osteoarthritis of both hips. XR LEFT HIP 2/23/20 [facility]  IMPRESSION:  1. Negative for fracture. 2. Osteopenia and mild osteoarthritis of the left knee. IMPRESSION:      ICD-10-CM ICD-9-CM    1. Primary osteoarthritis of left knee  M17.12 715.16 sodium hyaluronate (viscosup) (ORTHOVISC) 30 mg/2 mL injection syrg 30 mg      [place] DRAIN/INJECT LARGE JOINT/BURSA   2. Chronic pain of left knee  M25.562 719.46 sodium hyaluronate (viscosup) (ORTHOVISC) 30 mg/2 mL injection syrg 30 mg    G89.29 338.29 [place] DRAIN/INJECT LARGE JOINT/BURSA   3. Lumbar pain  M54.5 724.2 REFERRAL TO SPINE SURGERY      REFERRAL TO PHYSICAL THERAPY     PLAN:  OTC analgesics for pain. After discussing treatment options, patient's left knee was injected with 2 cc of Orthovisc. Lower lumbar trigger point area was injected 4 cc 0.25% Marcaine and 0.5 cc Celestone. Mr. Nean Beltre will follow up in one week to continue his his visco supplementation injection series. He will start a brief course of outpatient physical therapy. There is no need for surgery at this time. He will follow up as needed.       Scribed by Bettie Frederick ([place]) as dictated by Soren Winn MD

## 2021-04-30 NOTE — LETTER
4/30/2021 Patient: Erica Moseley YOB: 1960 Date of Visit: 4/30/2021 Dannie Apley, [address] [place] [address] [address] [place], [place] [id number] Via Fax: [phone] Dear Dannie Apley, MD, Thank you for referring Mr. Ugo Sorto to [address] [place] [address] for evaluation. My notes for this consultation are attached. If you have questions, please do not hesitate to call me. I look forward to following your patient along with you.  
 
 
Sincerely, 
 
Livia Andrews MD

## 2021-04-30 NOTE — PROGRESS NOTES
Patient: Luis Antonio Moya                MRN: [id number]       SSN: [id number] YOB: 1960        AGE: 56 y.o. SEX: male Body mass index is 22.59 kg/m². PCP: Ulises Tejeda MD 
04/30/21 Chief Complaint Patient presents with  Knee Pain  
  bilateral  
 Hip Pain  
  bilateral  
 Back Pain HISTORY:  Luis Antonio Moya is a 56 y.o. male who is seen for left knee pain. ICD-10-CM ICD-9-CM 1. Primary osteoarthritis of left knee  M17.12 715.16   
2. Chronic pain of left knee  M25.562 719.46   
 G89.29 338.29 PROCEDURE:  After discussing treatment options, Mr. Chace Valle left knee was injected with 2 cc of Orthovisc. Chart reviewed for the following: 
 Catie Garcia MD, have reviewed the History, Physical and updated the Allergic reactions for Luis Antonio Moya TIME OUT performed immediately prior to start of procedure: 
Catie Garcia MD, have performed the following reviews on Luis Antonio Moya prior to the start of the procedure: 
         
* Patient was identified by name and date of birth * Agreement on procedure being performed was verified * Risks and Benefits explained to the patient * Procedure site verified and marked as necessary * Patient was positioned for comfort * Consent was obtained Time: 2:10 PM  
 
Date of procedure: 4/30/2021 Procedure performed by:  Sera Cat MD 
 
Mr. Kristin Adrian tolerated the procedure well with no complications. PLAN:  After discussing treatment options, patient's left knee was injected with 2 cc of Orthovisc. Mr. Kristin Adrian will follow up in one week to continue his his visco supplementation injection series.    
 
Scribed by Heavenly Luke ([address]) as dictated by Sera Cat MD

## 2021-05-07 ENCOUNTER — OFFICE VISIT (OUTPATIENT)
Dept: ORTHOPEDIC SURGERY | Age: 61
End: 2021-05-07
Payer: MEDICARE

## 2021-05-07 VITALS
HEIGHT: 71 IN | TEMPERATURE: 97.1 F | WEIGHT: 154 LBS | BODY MASS INDEX: 21.56 KG/M2 | OXYGEN SATURATION: 98 % | RESPIRATION RATE: 16 BRPM | HEART RATE: 95 BPM

## 2021-05-07 DIAGNOSIS — M17.12 PRIMARY OSTEOARTHRITIS OF LEFT KNEE: Primary | ICD-10-CM

## 2021-05-07 DIAGNOSIS — G89.29 CHRONIC PAIN OF LEFT KNEE: ICD-10-CM

## 2021-05-07 DIAGNOSIS — M25.562 CHRONIC PAIN OF LEFT KNEE: ICD-10-CM

## 2021-05-07 PROCEDURE — 20610 DRAIN/INJ JOINT/BURSA W/O US: CPT | Performed by: SPECIALIST

## 2021-05-07 NOTE — PROGRESS NOTES
Patient: Migdalia Moore                MRN: [id number]       SSN: [id number]  YOB: 1960        AGE: 56 y.o. SEX: male  Body mass index is 21.48 kg/m². PCP: Scar Ferguson MD  05/07/21    CC: LEFT KNEE PAIN    HISTORY:  Migdalia Moore is a 56 y.o. male who is seen for left knee pain. TIME OUT performed immediately prior to start of procedure:  Elis Best MD, have performed the following reviews on Migdalia Moore prior to the start of the procedure:            * Patient was identified by name and date of birth   * Agreement on procedure being performed was verified  * Risks and Benefits explained to the patient  * Procedure site verified and marked as necessary  * Patient was positioned for comfort  * Consent was obtained     Time: 2:15 PM     Date of procedure: 5/7/2021    Procedure performed by:  Fracisco Young MD    Mr. Janeen Plata tolerated the procedure well with no complications      [id number] ICD-9-CM    1. Primary osteoarthritis of left knee  M17.12 715.16 sodium hyaluronate (viscosup) (ORTHOVISC) 30 mg/2 mL injection syrg 30 mg      DRAIN/INJECT LARGE JOINT/BURSA   2. Chronic pain of left knee  M25.562 719.46 sodium hyaluronate (viscosup) (ORTHOVISC) 30 mg/2 mL injection syrg 30 mg    G89.29 338.29 DRAIN/INJECT LARGE JOINT/BURSA     PLAN:  After discussing treatment options, patient's left knee was injected with 2 cc of Orthovisc. Mr. Janeen Plata will follow up PRN now that he has completed his visco supplementation injection series.       Scribed by Antonia House ([address]) as dictated by Fracisco Young MD

## 2021-06-10 ENCOUNTER — TELEPHONE (OUTPATIENT)
Dept: ORTHOPEDIC SURGERY | Age: 61
End: 2021-06-10

## 2021-06-10 DIAGNOSIS — M25.562 CHRONIC PAIN OF LEFT KNEE: ICD-10-CM

## 2021-06-10 DIAGNOSIS — M17.12 PRIMARY OSTEOARTHRITIS OF LEFT KNEE: Primary | ICD-10-CM

## 2021-06-10 DIAGNOSIS — G89.29 CHRONIC PAIN OF LEFT KNEE: ICD-10-CM

## 2021-06-10 NOTE — TELEPHONE ENCOUNTER
Patient wife Heena Jarrett ( on hipaa ) called for . Mrs. Sruthi Stovall said that they need a new order for the patient Left Knee and Back of the leg be sent to [facility] at the [facility] location. [facility]   Tel. [phone]  Fax # [phone]. Mrs. Phillipstessiesarah. [phone].

## 2021-07-01 ENCOUNTER — APPOINTMENT (OUTPATIENT)
Dept: PHYSICAL THERAPY | Age: 61
End: 2021-07-01
Attending: SPECIALIST

## 2021-07-15 ENCOUNTER — APPOINTMENT (OUTPATIENT)
Dept: PHYSICAL THERAPY | Age: 61
End: 2021-07-15
Attending: SPECIALIST
Payer: MEDICARE

## 2021-07-16 ENCOUNTER — HOSPITAL ENCOUNTER (OUTPATIENT)
Dept: PHYSICAL THERAPY | Age: 61
Discharge: HOME | End: 2021-07-16
Attending: SPECIALIST
Payer: MEDICARE

## 2021-07-16 PROCEDURE — 97110 THERAPEUTIC EXERCISES: CPT

## 2021-07-16 PROCEDURE — 97161 PT EVAL LOW COMPLEX 20 MIN: CPT

## 2021-07-16 NOTE — PROGRESS NOTES
PT DAILY TREATMENT NOTE     Patient Name: Katalina Jones  Date:2021  : 1960  [x]  Patient  Verified  Payor: Mickie Shaw / Plan: [address] [place] [address] HMO / Product Type: Managed Care Medicare /    In time:3:04  Out time:3:39  Total Treatment Time (min): 35  Visit #: 1 of 8    Medicare/BCBS Only   Total Timed Codes (min):  20 1:1 Treatment Time:  35       Treatment Area: Unilateral primary osteoarthritis, left knee [M17.12]  Pain in left knee [M25.562]    SUBJECTIVE  Pain Level (0-10 scale): 6-7  Any medication changes, allergies to medications, adverse drug reactions, diagnosis change, or new procedure performed?: [x] No    [] Yes (see summary sheet for update)  Subjective functional status/changes:   [] No changes reported  Pt reports increased pain with standing/ambulating    OBJECTIVE    Modality rationale: PD to improve the patients ability to    Min Type Additional Details    [] Estim:  []Unatt       []IFC  []Premod                        []Other:  []w/ice   []w/heat  Position:  Location:    [] Estim: []Att    []TENS instruct  []NMES                    []Other:  []w/US   []w/ice   []w/heat  Position:  Location:    []  Traction: [] Cervical       []Lumbar                       [] Prone          []Supine                       []Intermittent   []Continuous Lbs:  [] before manual  [] after manual    []  Ultrasound: []Continuous   [] Pulsed                           []1MHz   []3MHz W/cm2:  Location:    []  Iontophoresis with dexamethasone         Location: [] Take home patch   [] In clinic    []  Ice     []  heat  []  Ice massage  []  Laser   []  Anodyne Position:  Location:    []  Laser with stim  []  Other:  Position:  Location:    []  Vasopneumatic Device    []  Right     []  Left  Pre-treatment girth:  Post-treatment girth:  Measured at (location):  Pressure:       [] lo [] med [] hi   Temperature: [] lo [] med [] hi   [] Skin assessment post-treatment:  []intact []redness- no adverse reaction    []redness  adverse reaction:     15 min [x]Eval                  []Re-Eval       20 min Therapeutic Exercise:  [] See flow sheet : including pt education   Rationale: increase ROM and increase strength to improve the patients ability to perform ADLs          With   [] TE   [] TA   [] neuro   [] other: Patient Education: [x] Review HEP    [] Progressed/Changed HEP based on:   [] positioning   [] body mechanics   [] transfers   [] heat/ice application    [] other:      Other Objective/Functional Measures:      Pain Level (0-10 scale) post treatment: 6-7    ASSESSMENT/Changes in Function: see POC    Patient will continue to benefit from skilled PT services to modify and progress therapeutic interventions, address functional mobility deficits, address ROM deficits, address strength deficits, analyze and address soft tissue restrictions, analyze and cue movement patterns, analyze and modify body mechanics/ergonomics and address imbalance/dizziness to attain remaining goals. [x]  See Plan of Care  []  See progress note/recertification  []  See Discharge Summary         Progress towards goals / Updated goals:  Short Term Goals: To be accomplished in 2 weeks:  1. Pt will demonstrate I and compliance with HEP to maximize therapeutic effect. IE: HEP issued and instructed  2. Pt will perform 30 minutes of aquatic therapy without increase in pain to improve conditioning and activity tolerance. IE: limited standing tolerance with ADLs  Long Term Goals: To be accomplished in 4 weeks:  1. Pt will demonstrate B knee extension strength 5/5 for improved ease of stair negotiation. IE: 4+/5 B  2. Pt will demonstrate 4+/5 hip abd strength B to improve stability with gait. IE: 4/5  3. Pt will demonstrate 5 sit to stands in 20\" to improve transfer efficiency. IE: challenged with sit to stand transfers  4.  Pt will report little difficulty walking between rooms to improve functional mobility and quality of life.   IE: quite a bit of difficulty per FOTO    PLAN  []  Upgrade activities as tolerated     [x]  Continue plan of care  []  Update interventions per flow sheet       []  Discharge due to:_  []  Other:_      Cheli Click DPT CMTPT 7/16/2021  3:52 PM    Future Appointments   Date Time Provider Wolfgang Adrianne   7/21/2021  3:00 PM [facility]   7/23/2021 11:00 AM Bishnu Wills MD [facility]   7/30/2021  1:45 PM [facility]

## 2021-07-16 NOTE — PROGRESS NOTES
[facility]  [address]  [place], [address]  [phone] fax    Plan of Care/ Statement of Necessity for Physical Therapy Services    Patient name: Maricel Townsend Start of Care: 2021   Referral source: Dayna Orlando MD : 1960    Medical Diagnosis: Unilateral primary osteoarthritis, left knee [M17.12]  Pain in left knee [M25.562]  Payor: [employer] MEDICARE / Plan: [address] [place] [address] HMO / Product Type: Managed Care Medicare /  Onset Date:6-8 months    Treatment Diagnosis: B knee and hip pain   Prior Hospitalization: see medical history Provider#: [id number]   Medications: Verified on Patient summary List    Comorbidities: arthritis, neuropathy, HTN, diabetes, alcohol use, Fibromyalgia   Prior Level of Function: Pt with rather chronic deconditioned state but reports improved mobility prior to falls       The Plan of Care and following information is based on the information from the initial evaluation. Assessment/ key information: The pt is a 55 y/o M presenting with c/o left > right knee pain and B hip pain in conjunction with LBP. He reports some left LE weakness noted prior to his first fall about 6-8 months ago, but increased following. Then he fell again on the stairs on 7/10/21 and has had even worse left knee pain. Pt reports that his pain is not too bad in sitting but as soon as he stands his hips and left knee begin to hurt. He reports some instability in his right hip in standing causing some pain and buckling intermittently. Hip PROM bilaterally is good, some mild tension into flexion. OKC knee strength good overall with some pain at left patella. B hip abd strength is limited. Pt was educated on aquatic therapy setting today and would benefit from PT to improve ROM, strength and functional mobility.      Evaluation Complexity History HIGH Complexity :3+ comorbidities / personal factors will impact the outcome/ POC ; Examination LOW Complexity : 1-2 Standardized tests and measures addressing body structure, function, activity limitation and / or participation in recreation  ;Presentation LOW Complexity : Stable, uncomplicated  ;Clinical Decision Making HIGH Complexity : FOTO score of 1- 25   Overall Complexity Rating: LOW   Problem List: pain affecting function, decrease ROM, decrease strength, impaired gait/ balance, decrease ADL/ functional abilitiies, decrease activity tolerance, decrease flexibility/ joint mobility and decrease transfer abilities   Treatment Plan may include any combination of the following: Therapeutic exercise, Therapeutic activities, Neuromuscular re-education, Physical agent/modality, Gait/balance training, Manual therapy, Aquatic therapy, Patient education, Self Care training, Functional mobility training and Stair training  Patient / Family readiness to learn indicated by: trying to perform skills and interest  Persons(s) to be included in education: patient (P) and family support person (FSP);list spouse  Barriers to Learning/Limitations: yes;  other transportation  Patient Goal (s): Less pain and more strength/mobility  Patient Self Reported Health Status: poor  Rehabilitation Potential: good    Short Term Goals: To be accomplished in 2 weeks:  1. Pt will demonstrate I and compliance with HEP to maximize therapeutic effect. 2. Pt will perform 30 minutes of aquatic therapy without increase in pain to improve conditioning and activity tolerance. Long Term Goals: To be accomplished in 4 weeks:  1. Pt will demonstrate B knee extension strength 5/5 for improved ease of stair negotiation. 2. Pt will demonstrate 4+/5 hip abd strength B to improve stability with gait. 3. Pt will demonstrate 5 sit to stands in 20\" to improve transfer efficiency. 4. Pt will report little difficulty walking between rooms to improve functional mobility and quality of life.     Frequency / Duration: Patient to be seen 2 times per week for 4 weeks. Patient/ Caregiver education and instruction: Diagnosis, prognosis, self care, activity modification and exercises   [x]  Plan of care has been reviewed with PTA    Certification Period: 7/16/2021 to 8/13/2021  Dai Tahira DPT CMTPT 7/16/2021 3:43 PM    ________________________________________________________________________    I certify that the above Therapy Services are being furnished while the patient is under my care. I agree with the treatment plan and certify that this therapy is necessary.     [de-identified] Signature:____________________  Date:____________Time: _________     Dayna Orlando MD  Please sign and return to [facility] [address] [place] [address] [place] Coreas [address] [place] [address], [facility] [address]  [phone] fax

## 2021-07-21 ENCOUNTER — HOSPITAL ENCOUNTER (OUTPATIENT)
Dept: PHYSICAL THERAPY | Age: 61
Discharge: HOME | End: 2021-07-21
Attending: SPECIALIST
Payer: MEDICARE

## 2021-07-21 PROCEDURE — 97113 AQUATIC THERAPY/EXERCISES: CPT

## 2021-07-21 NOTE — PROGRESS NOTES
PT DAILY TREATMENT NOTE     Patient Name: Reny Ochoa  Date:2021  : 1960  [x]  Patient  Verified  Payor: Kendra Akbar / Plan: [address] [place] [address] HMO / Product Type: Managed Care Medicare /    In time:3:07  Out time:3:40  Total Treatment Time (min): 33  Visit #: 2 of 8    Medicare/BCBS Only   Total Timed Codes (min):  33 1:1 Treatment Time:  33       Treatment Area: Unilateral primary osteoarthritis, left knee [M17.12]  Pain in left knee [M25.562]    SUBJECTIVE  Pain Level (0-10 scale): 9/10  Any medication changes, allergies to medications, adverse drug reactions, diagnosis change, or new procedure performed?: [x] No    [] Yes (see summary sheet for update)  Subjective functional status/changes:   [] No changes reported  Pt reports no new complaints of pain. Pt reports partial compliance with HEP. OBJECTIVE    33 min Therapeutic Exercise:  [x] See flow sheet : Aquatic therapy   Rationale: increase ROM, increase strength and improve coordination to improve the patients ability to perform ADLs with increased ease. With   [] TE   [] TA   [] neuro   [] other: Patient Education: [x] Review HEP    [] Progressed/Changed HEP based on:   [] positioning   [] body mechanics   [] transfers   [] heat/ice application    [] other:      Other Objective/Functional Measures: Initiated exercises as per flow sheet. Pain Level (0-10 scale) post treatment: 8/10    ASSESSMENT/Changes in Function: Pt demonstrates good control with all therapeutic exercises with no adverse reaction. Patient will continue to benefit from skilled PT services to modify and progress therapeutic interventions, address functional mobility deficits, address ROM deficits, address strength deficits, analyze and address soft tissue restrictions and analyze and cue movement patterns to attain remaining goals.      []  See Plan of Care  []  See progress note/recertification  []  See Discharge Summary         Progress towards goals / Updated goals:  Short Term Goals: To be accomplished in 2 weeks:  1. Pt will demonstrate I and compliance with HEP to maximize therapeutic effect. IE: HEP issued and instructed   Current: partially met per patient report. 07/21/2021  2. Pt will perform 30 minutes of aquatic therapy without increase in pain to improve conditioning and activity tolerance. IE: limited standing tolerance with ADLs  Long Term Goals: To be accomplished in 4 weeks:  1. Pt will demonstrate B knee extension strength 5/5 for improved ease of stair negotiation. IE: 4+/5 B  2. Pt will demonstrate 4+/5 hip abd strength B to improve stability with gait. IE: 4/5  3. Pt will demonstrate 5 sit to stands in 20\" to improve transfer efficiency. IE: challenged with sit to stand transfers  4. Pt will report little difficulty walking between rooms to improve functional mobility and quality of life.               IE: quite a bit of difficulty per [address]  []  Upgrade activities as tolerated     [x]  Continue plan of care  []  Update interventions per flow sheet       []  Discharge due to:_  []  Other:_      Lukas Lyman PTA 7/21/2021  3:10 PM    Future Appointments   Date Time Provider Wolfgang Silver   7/23/2021 11:00 AM Vanessa Santoyo MD [facility]   7/30/2021  1:45 PM Allyn Murcia PTA [id number]

## 2021-07-30 ENCOUNTER — HOSPITAL ENCOUNTER (OUTPATIENT)
Dept: PHYSICAL THERAPY | Age: 61
Discharge: HOME | End: 2021-07-30
Attending: SPECIALIST
Payer: MEDICARE

## 2021-07-30 PROCEDURE — 97113 AQUATIC THERAPY/EXERCISES: CPT

## 2021-08-17 ENCOUNTER — APPOINTMENT (OUTPATIENT)
Dept: PHYSICAL THERAPY | Age: 61
End: 2021-08-17
Attending: SPECIALIST

## 2021-08-19 ENCOUNTER — APPOINTMENT (OUTPATIENT)
Dept: PHYSICAL THERAPY | Age: 61
End: 2021-08-19
Attending: SPECIALIST

## 2021-08-19 NOTE — PROGRESS NOTES
[facility]  [address]  [place], [address]  [phone] fax    Physical Therapy Discharge Summary  Patient name: Kayley Box Start of Care: 2021   Referral source: Sophie Thorpe MD : 1960               Medical Diagnosis: Unilateral primary osteoarthritis, left knee [M17.12]  Pain in left knee [M25.562]  Payor: [employer] MEDICARE / Plan: [address] [place] [address] HMO / Product Type: Managed Care Medicare /  Onset Date:6-8 months               Treatment Diagnosis: B knee and hip pain   Prior Hospitalization: see medical history Provider#: [id number]   Medications: Verified on Patient summary List    Comorbidities: arthritis, neuropathy, HTN, diabetes, alcohol use, Fibromyalgia   Prior Level of Function: Pt with rather chronic deconditioned state but reports improved mobility prior to falls     Visits from Start of Care: 3    Missed Visits: 3  Reporting Period : 2021 to 2021      Summary of Care:  Short Term Goals: To be accomplished in 2 weeks:  1. Pt will demonstrate I and compliance with HEP to maximize therapeutic effect. IE: HEP issued and instructed              Current: partially met per patient report. 2. Pt will perform 30 minutes of aquatic therapy without increase in pain to improve conditioning and activity tolerance. IE: limited standing tolerance with ADLs              Current: met per patient report he tolerated aquatic PT well with decreased symptoms after last treatment. Long Term Goals: To be accomplished in 4 weeks:  1. Pt will demonstrate B knee extension strength 5/5 for improved ease of stair negotiation. IE: 4+/5 B   Current: unable to reassess. 2. Pt will demonstrate 4+/5 hip abd strength B to improve stability with gait. IE: 4/5   Current: unable to reassess. 3. Pt will demonstrate 5 sit to stands in 20\" to improve transfer efficiency. IE: challenged with sit to stand transfers    Current: unable to reassess. 4. Pt will report little difficulty walking between rooms to improve functional mobility and quality of life. IE: quite a bit of difficulty per FOTO              Current: Remains per patient report. 07/30/2021    ASSESSMENT/RECOMMENDATIONS:  Unable to further assess progress towards goals at this time due to non-compliance/lack of attendance. DC at this time with no further instructions to the patient.   Thank you for this referral.    [x]Discontinue therapy: []Patient has reached or is progressing toward set goals      [x]Patient is non-compliant or has abdicated      []Due to lack of appreciable progress towards set goals    Kieran Tilley, PTA 8/19/2021 2:31 PM  Co-Sign: hCace Gibson DPT CMTPT

## 2021-08-23 ENCOUNTER — APPOINTMENT (OUTPATIENT)
Dept: PHYSICAL THERAPY | Age: 61
End: 2021-08-23
Attending: SPECIALIST

## 2021-08-25 ENCOUNTER — APPOINTMENT (OUTPATIENT)
Dept: PHYSICAL THERAPY | Age: 61
End: 2021-08-25
Attending: SPECIALIST

## 2021-08-30 ENCOUNTER — APPOINTMENT (OUTPATIENT)
Dept: PHYSICAL THERAPY | Age: 61
End: 2021-08-30
Attending: SPECIALIST

## 2024-07-08 NOTE — PROGRESS NOTES
PT DAILY TREATMENT NOTE     Patient Name: Florian Rubinstein  Date:2021  : 1960  [x]  Patient  Verified  Payor: John Liu / Plan: [address] [place] [address] HMO / Product Type: Managed Care Medicare /    In time:1:48  Out time:2:28  Total Treatment Time (min): 40  Visit #: 3 of 8    Medicare/BCBS Only   Total Timed Codes (min):  40 1:1 Treatment Time:  40       Treatment Area: Unilateral primary osteoarthritis, left knee [M17.12]  Pain in left knee [M25.562]    SUBJECTIVE  Pain Level (0-10 scale): 6-7/10  Any medication changes, allergies to medications, adverse drug reactions, diagnosis change, or new procedure performed?: [x] No    [] Yes (see summary sheet for update)  Subjective functional status/changes:   [] No changes reported  Pt reports his pain is better today due to taking medication. Pt reports he felt good after last aquatic PT session. OBJECTIVE    40 min Therapeutic Exercise:  [x] See flow sheet :Aquatic therapy   Rationale: increase ROM and increase strength to improve the patients ability to improve tolerance to ADLs. With   [] TE   [] TA   [] neuro   [] other: Patient Education: [x] Review HEP    [] Progressed/Changed HEP based on:   [] positioning   [] body mechanics   [] transfers   [] heat/ice application    [] other:      Other Objective/Functional Measures: Added squats. Pain Level (0-10 scale) post treatment: 7/10    ASSESSMENT/Changes in Function: Pt has slight increased in hip and knee pain but overall has no adverse reaction to treatment  Pt demonstrates good form and control with all aquatic based exercises.      Patient will continue to benefit from skilled PT services to modify and progress therapeutic interventions, address functional mobility deficits, address ROM deficits, address strength deficits, analyze and address soft tissue restrictions, analyze and cue movement patterns and analyze and modify body mechanics/ergonomics to attain remaining Synthroid inc to 125 mcg recently  Recheck Thyroid function test today   goals.     []  See Plan of Care  []  See progress note/recertification  []  See Discharge Summary         Progress towards goals / Updated goals:  Short Term Goals: To be accomplished in 2 weeks:  1. Pt will demonstrate I and compliance with HEP to maximize therapeutic effect.              IY: HEP issued and instructed              Current: partially met per patient report. 07/21/2021  2. Pt will perform 30 minutes of aquatic therapy without increase in pain to improve conditioning and activity tolerance.              IE: limited standing tolerance with ADLs   Current: met per patient report he tolerated aquatic PT well with decreased symptoms after last treatment. 07/30/2021  Long Term Goals: To be accomplished in 4 weeks:  1. Pt will demonstrate B knee extension strength 5/5 for improved ease of stair negotiation.              IE: 4+/5 B  2. Pt will demonstrate 4+/5 hip abd strength B to improve stability with gait.             NL: 4/5  3. Pt will demonstrate 5 sit to stands in 20\" to improve transfer efficiency.             ZP: challenged with sit to stand transfers  4. Pt will report little difficulty walking between rooms to improve functional mobility and quality of life.              IE: quite a bit of difficulty per FOTO   Current: Remains per patient report. 07/30/2021    PLAN  []  Upgrade activities as tolerated     [x]  Continue plan of care  []  Update interventions per flow sheet       []  Discharge due to:_  []  Other:_      Damian Bernard PTA 7/30/2021  1:47 PM    No future appointments.